# Patient Record
Sex: FEMALE | Race: WHITE | NOT HISPANIC OR LATINO | Employment: OTHER | URBAN - METROPOLITAN AREA
[De-identification: names, ages, dates, MRNs, and addresses within clinical notes are randomized per-mention and may not be internally consistent; named-entity substitution may affect disease eponyms.]

---

## 2020-01-02 ENCOUNTER — TELEPHONE (OUTPATIENT)
Dept: OBSTETRICS AND GYNECOLOGY | Facility: CLINIC | Age: 85
End: 2020-01-02

## 2020-03-16 ENCOUNTER — OFFICE VISIT (OUTPATIENT)
Dept: OBSTETRICS AND GYNECOLOGY | Facility: CLINIC | Age: 85
End: 2020-03-16

## 2020-03-16 VITALS
BODY MASS INDEX: 19.96 KG/M2 | SYSTOLIC BLOOD PRESSURE: 154 MMHG | WEIGHT: 99 LBS | DIASTOLIC BLOOD PRESSURE: 72 MMHG | HEIGHT: 59 IN

## 2020-03-16 DIAGNOSIS — N81.10 VAGINAL PROLAPSE: Primary | ICD-10-CM

## 2020-03-16 DIAGNOSIS — N76.0 BV (BACTERIAL VAGINOSIS): ICD-10-CM

## 2020-03-16 DIAGNOSIS — Z46.89 PESSARY MAINTENANCE: ICD-10-CM

## 2020-03-16 DIAGNOSIS — B96.89 BV (BACTERIAL VAGINOSIS): ICD-10-CM

## 2020-03-16 PROCEDURE — 99213 OFFICE O/P EST LOW 20 MIN: CPT | Performed by: OBSTETRICS & GYNECOLOGY

## 2020-03-16 RX ORDER — METRONIDAZOLE 250 MG/1
250 TABLET ORAL 2 TIMES DAILY
Qty: 14 TABLET | Refills: 0 | Status: SHIPPED | OUTPATIENT
Start: 2020-03-16 | End: 2020-03-23

## 2020-03-16 RX ORDER — PRAVASTATIN SODIUM 20 MG
TABLET ORAL
COMMUNITY
Start: 2020-01-04 | End: 2021-03-30

## 2020-03-16 NOTE — PROGRESS NOTES
Subjective   Shruti Morgan is a 84 y.o. female with vaginal discharge    History of Present Illness   84-year-old postmenopausal white female who has not been seen since 2016 complains of a year-long vaginal discharge.  She had a pessary placed in 2016 and has not been seen since.  The discharge is foul smelling and has occasional pink tinge to it.  She denies any pain.  She has occasional urinary incontinence but it is much better than prior to pessary placement.     The following portions of the patient's history were reviewed and updated as appropriate: allergies, current medications, past family history, past medical history, past social history, past surgical history and problem list.    Review of Systems   Genitourinary: Positive for vaginal discharge. Negative for pelvic pain and vaginal bleeding.       Objective   Physical Exam   The patient is alert and conversant and in no acute distress.  She is oriented to time and place.  The abdomen is soft, flat and nontender.  No masses were palpable.  The vulva and perineum are atrophic.  The ring pessary was removed and copious foul-smelling discharge covered the pessary.  The vagina was inspected with some erythema of the sidewalls but no evidence of erosion.  Bimanual exam is without mass or tenderness.  The pessary was cleaned and replaced.  The patient tolerated the procedure well.    Assessment/Plan   Shruti was seen today for follow-up.    Diagnoses and all orders for this visit:    Vaginal prolapse    Pessary maintenance    BV (bacterial vaginosis)  -     metroNIDAZOLE (FLAGYL) 250 MG tablet; Take 1 tablet by mouth 2 (Two) Times a Day for 7 days.    We discussed her symptoms and the findings.  I recommended the patient take 7 days of metronidazole and recommended more frequent visits for pessary maintenance.  The patient has difficulties with transportation but I recommended she return at least in 6 months for recheck.

## 2021-03-30 ENCOUNTER — OFFICE VISIT (OUTPATIENT)
Dept: OBSTETRICS AND GYNECOLOGY | Facility: CLINIC | Age: 86
End: 2021-03-30

## 2021-03-30 VITALS
DIASTOLIC BLOOD PRESSURE: 79 MMHG | BODY MASS INDEX: 20.36 KG/M2 | WEIGHT: 101 LBS | SYSTOLIC BLOOD PRESSURE: 151 MMHG | HEIGHT: 59 IN

## 2021-03-30 DIAGNOSIS — Z46.89 PESSARY MAINTENANCE: Primary | ICD-10-CM

## 2021-03-30 DIAGNOSIS — N89.8 VAGINAL DISCHARGE: ICD-10-CM

## 2021-03-30 PROCEDURE — 99212 OFFICE O/P EST SF 10 MIN: CPT | Performed by: STUDENT IN AN ORGANIZED HEALTH CARE EDUCATION/TRAINING PROGRAM

## 2021-03-30 RX ORDER — PRAVASTATIN SODIUM 20 MG
20 TABLET ORAL DAILY
COMMUNITY

## 2021-04-01 LAB
A VAGINAE DNA VAG QL NAA+PROBE: NORMAL SCORE
BVAB2 DNA VAG QL NAA+PROBE: NORMAL SCORE
C ALBICANS DNA VAG QL NAA+PROBE: NEGATIVE
C GLABRATA DNA VAG QL NAA+PROBE: NEGATIVE
MEGA1 DNA VAG QL NAA+PROBE: NORMAL SCORE